# Patient Record
Sex: MALE | Race: WHITE | Employment: OTHER | URBAN - METROPOLITAN AREA
[De-identification: names, ages, dates, MRNs, and addresses within clinical notes are randomized per-mention and may not be internally consistent; named-entity substitution may affect disease eponyms.]

---

## 2017-09-21 DIAGNOSIS — K22.4 DYSKINESIA OF ESOPHAGUS: ICD-10-CM

## 2017-09-21 NOTE — TELEPHONE ENCOUNTER
Pending Prescriptions:                       Disp   Refills    diazepam (VALIUM) 5 MG tablet [Pharmacy Me*40 tab*         Sig: TAKE 1-2 TABLETS BY MOUTH EVERY 6 HOURS AS NEEDED FOR           SPASM         Last Written Prescription Date: 5/24/2016  Last Fill Quantity: 40,  # refills: 0   Last Office Visit with FMDONNELL, [facility] or [facility] prescribing provider: 6/2/2016

## 2017-09-22 ENCOUNTER — OFFICE VISIT (OUTPATIENT)
Dept: FAMILY MEDICINE | Facility: CLINIC | Age: 52
End: 2017-09-22
Payer: COMMERCIAL

## 2017-09-22 VITALS
TEMPERATURE: 98.1 F | SYSTOLIC BLOOD PRESSURE: 128 MMHG | HEART RATE: 88 BPM | OXYGEN SATURATION: 98 % | WEIGHT: 201.4 LBS | DIASTOLIC BLOOD PRESSURE: 78 MMHG | BODY MASS INDEX: 26.69 KG/M2 | RESPIRATION RATE: 15 BRPM | HEIGHT: 73 IN

## 2017-09-22 DIAGNOSIS — K22.4 DYSKINESIA OF ESOPHAGUS: Primary | ICD-10-CM

## 2017-09-22 PROCEDURE — 99213 OFFICE O/P EST LOW 20 MIN: CPT | Performed by: PHYSICIAN ASSISTANT

## 2017-09-22 RX ORDER — DIAZEPAM 5 MG
TABLET ORAL
Qty: 40 TABLET | Refills: 0 | Status: SHIPPED | OUTPATIENT
Start: 2017-09-22 | End: 2019-02-05

## 2017-09-22 RX ORDER — OXYCODONE HYDROCHLORIDE 5 MG/1
5 TABLET ORAL EVERY 4 HOURS PRN
Qty: 20 TABLET | Refills: 0 | Status: SHIPPED | OUTPATIENT
Start: 2017-09-22 | End: 2019-02-05

## 2017-09-22 RX ORDER — DIAZEPAM 5 MG
TABLET ORAL
Start: 2017-09-22

## 2017-09-22 NOTE — TELEPHONE ENCOUNTER
Former [facility] patient  Also seen over a year ago  Advised appt with new PCP    Next 5 appointments (look out 90 days)     Sep 22, 2017  1:00 PM CDT   Office Visit with Familia Samano PA-C   [facility]    [address] [place] [address]   [phone]                Raquel LONG RN

## 2017-09-22 NOTE — NURSING NOTE
"Chief Complaint   Patient presents with     Establish Care     Would like refills of Oxycodone and Diazepam (former Dr. Gupta patient)       Initial /78  Pulse 88  Temp 98.1  F (36.7  C) (Oral)  Resp 15  Ht 6' 1\" (1.854 m)  Wt 201 lb 6.4 oz (91.4 kg)  SpO2 98%  BMI 26.57 kg/m2 Estimated body mass index is 26.57 kg/(m^2) as calculated from the following:    Height as of this encounter: 6' 1\" (1.854 m).    Weight as of this encounter: 201 lb 6.4 oz (91.4 kg).  Medication Reconciliation: complete    Health Maintenance Due Pending Provider Review:  NONE    n/a    Laine Cabrera MA  [facility]  "

## 2017-09-22 NOTE — MR AVS SNAPSHOT
"              After Visit Summary   9/22/2017    Jon Styles    MRN: [id number]           Patient Information     Date Of Birth          1965        Visit Information        Provider Department      9/22/2017 1:00 PM Familia Samano PA-C [facility]        Today's Diagnoses     Dyskinesia of esophagus    -  1       Follow-ups after your visit        Follow-up notes from your care team     Return if symptoms worsen or fail to improve.      Who to contact     If you have questions or need follow up information about today's clinic visit or your schedule please contact [facility] directly at [phone].  Normal or non-critical lab and imaging results will be communicated to you by [employer], letter or phone within 4 business days after the clinic has received the results. If you do not hear from us within 7 days, please contact the clinic through [employer] or phone. If you have a critical or abnormal lab result, we will notify you by phone as soon as possible.  Submit refill requests through [employer] or call your pharmacy and they will forward the refill request to us. Please allow 3 business days for your refill to be completed.          Additional Information About Your Visit        MyChart Information     [employer] gives you secure access to your electronic health record. If you see a primary care provider, you can also send messages to your care team and make appointments. If you have questions, please call your primary care clinic.  If you do not have a primary care provider, please call [phone] and they will assist you.        Care EveryWhere ID     This is your Care EveryWhere ID. This could be used by other organizations to access your [facility] medical records  [id number]        Your Vitals Were     Pulse Temperature Respirations Height Pulse Oximetry BMI (Body Mass Index)    88 98.1  F (36.7  C) (Oral) 15 6' 1\" (1.854 m) 98% 26.57 kg/m2       Blood Pressure from Last 3 " Encounters:   09/22/17 128/78   06/02/16 104/71   03/25/16 116/62    Weight from Last 3 Encounters:   09/22/17 201 lb 6.4 oz (91.4 kg)   06/02/16 194 lb 1.6 oz (88 kg)   03/25/16 197 lb (89.4 kg)              Today, you had the following     No orders found for display         Where to get your medicines      Some of these will need a paper prescription and others can be bought over the counter.  Ask your nurse if you have questions.     Bring a paper prescription for each of these medications     diazepam 5 MG tablet    oxyCODONE 5 MG IR tablet          Primary Care Provider Office Phone # Fax #    Sergio Ezekiel Hastings [phone] [address] [facility] [address]        Equal Access to Services     NASEEM [facility] : Bernabe sweeney Sonegrita, waaxda luqadaha, qaybta kaalmachacorta madden, stephy alexander . So [facility] [phone].    ATENCIÓN: Si habla español, tiene a corey disposición servicios gratuitos de asistencia lingüística. Mohan al [phone].    We comply with applicable federal civil rights laws and [place] laws. We do not discriminate on the basis of race, color, national origin, age, disability sex, sexual orientation or gender identity.            Thank you!     Thank you for choosing [facility]  for your care. Our goal is always to provide you with excellent care. Hearing back from our patients is one way we can continue to improve our services. Please take a few minutes to complete the written survey that you may receive in the mail after your visit with us. Thank you!             Your Updated Medication List - Protect others around you: Learn how to safely use, store and throw away your medicines at www.disposemymeds.org.          This list is accurate as of: 9/22/17 11:59 PM.  Always use your most recent med list.                   Brand Name Dispense Instructions for use Diagnosis    diazepam 5 MG tablet    VALIUM    40 tablet     1-2 Q 6 HRS PRN SPASM    Dyskinesia of esophagus       oxyCODONE 5 MG IR tablet    ROXICODONE    20 tablet    Take 1 tablet (5 mg) by mouth every 4 hours as needed for pain    Dyskinesia of esophagus

## 2017-09-22 NOTE — PROGRESS NOTES
"  SUBJECTIVE:   Jon Styles is a 52 year old male who presents to clinic today for the following health issues:      Chief Complaint   Patient presents with     Establish Care     Would like refills of Oxycodone and Diazepam (former Dr. Gupta patient)              Problem list and histories reviewed & adjusted, as indicated.  Additional history: 52 y.o new to me male here for yearly follow up.  Starting over 10 years ago, he started to have these episodes where his digestion gets off track, and tends to build through the day, and starts to get some more pain over epigastric region.  The pain would increase and he would end up in ER most times.  Through the years, he has tried different treatment includes acid blockers.  He has tried dietary restrictions with getting rid of caffeine, alcohol, etc.  Did have upper endoscopy, which was overall ok.  Some mild inflammation.  He even went to [place], and nothing was really ever found to be an issue.  He has found that if he gets to it early, it does not progress.  He has relied on valium and oxycodone to treat symptoms.  At this point he is just managing symptoms.      The frequency tends to be quite varied.  He can go several months without, but then he can get a day or two in a row.      BP Readings from Last 3 Encounters:   09/22/17 128/78   06/02/16 104/71   03/25/16 116/62    Wt Readings from Last 3 Encounters:   09/22/17 201 lb 6.4 oz (91.4 kg)   06/02/16 194 lb 1.6 oz (88 kg)   03/25/16 197 lb (89.4 kg)                      Reviewed and updated as needed this visit by clinical staffTobacco  Allergies  Meds  Med Hx  Surg Hx  Fam Hx  Soc Hx      Reviewed and updated as needed this visit by Provider         ROS:  Constitutional, HEENT, cardiovascular, pulmonary, gi and gu systems are negative, except as otherwise noted.      OBJECTIVE:   /78  Pulse 88  Temp 98.1  F (36.7  C) (Oral)  Resp 15  Ht 6' 1\" (1.854 m)  Wt 201 lb 6.4 oz (91.4 kg)  SpO2 98% "  BMI 26.57 kg/m2  Body mass index is 26.57 kg/(m^2).  GENERAL: alert and no distress  EYES: Eyes grossly normal to inspection  HENT: ear canals and TM's normal, nose and mouth without ulcers or lesions  RESP: lungs clear to auscultation - no rales, rhonchi or wheezes  CV: regular rate and rhythm, normal S1 S2, no S3 or S4, no murmur, click or rub, no peripheral edema and peripheral pulses strong  PSYCH: mentation appears normal, affect normal/bright    Diagnostic Test Results:  none     ASSESSMENT/PLAN:         1. Dyskinesia of esophagus  I am not sure I can fully explain symptoms at this time.  Possibility of abdominal epilepsy, but with the rare frequency, I will let him determine if he wishes further follow up.  He does use very prn medications, so I am ok with previous refill policy.    - oxyCODONE (ROXICODONE) 5 MG IR tablet; Take 1 tablet (5 mg) by mouth every 4 hours as needed for pain  Dispense: 20 tablet; Refill: 0  - diazepam (VALIUM) 5 MG tablet; 1-2 Q 6 HRS PRN SPASM  Dispense: 40 tablet; Refill: 0    Follow up yearly.    Familia Samano PA-C  [facility]

## 2019-02-05 ENCOUNTER — OFFICE VISIT (OUTPATIENT)
Dept: FAMILY MEDICINE | Facility: CLINIC | Age: 54
End: 2019-02-05
Payer: COMMERCIAL

## 2019-02-05 VITALS
WEIGHT: 204 LBS | SYSTOLIC BLOOD PRESSURE: 107 MMHG | HEART RATE: 65 BPM | BODY MASS INDEX: 27.04 KG/M2 | RESPIRATION RATE: 14 BRPM | TEMPERATURE: 100.4 F | OXYGEN SATURATION: 99 % | HEIGHT: 73 IN | DIASTOLIC BLOOD PRESSURE: 63 MMHG

## 2019-02-05 DIAGNOSIS — K22.4 DYSKINESIA OF ESOPHAGUS: ICD-10-CM

## 2019-02-05 DIAGNOSIS — K21.00 GASTROESOPHAGEAL REFLUX DISEASE WITH ESOPHAGITIS: Primary | ICD-10-CM

## 2019-02-05 PROCEDURE — 99214 OFFICE O/P EST MOD 30 MIN: CPT | Performed by: FAMILY MEDICINE

## 2019-02-05 RX ORDER — OXYCODONE HYDROCHLORIDE 5 MG/1
5 TABLET ORAL EVERY 4 HOURS PRN
Qty: 20 TABLET | Refills: 0 | Status: SHIPPED | OUTPATIENT
Start: 2019-02-05 | End: 2020-06-02

## 2019-02-05 RX ORDER — DIAZEPAM 5 MG
TABLET ORAL
Qty: 20 TABLET | Refills: 0 | Status: SHIPPED | OUTPATIENT
Start: 2019-02-05 | End: 2020-06-02

## 2019-02-05 ASSESSMENT — MIFFLIN-ST. JEOR: SCORE: 1824.22

## 2019-02-05 NOTE — NURSING NOTE
"Chief Complaint   Patient presents with     Recheck Medication     follow up for refills       Initial /63   Pulse 65   Temp 100.4  F (38  C) (Tympanic)   Resp 14   Ht 1.854 m (6' 1\")   Wt 92.5 kg (204 lb)   SpO2 99%   BMI 26.91 kg/m   Estimated body mass index is 26.91 kg/m  as calculated from the following:    Height as of this encounter: 1.854 m (6' 1\").    Weight as of this encounter: 92.5 kg (204 lb).  BP completed using cuff size: large    Health Maintenance that is potentially due pending provider review:  Health Maintenance Due   Topic Date Due     HIV SCREEN (SYSTEM ASSIGNED)  02/22/1983     ZOSTER IMMUNIZATION (1 of 2) 02/22/2015     PHQ-2 Q1 YR  06/02/2017     DTAP/TDAP/TD IMMUNIZATION (2 - Td) 05/01/2018     INFLUENZA VACCINE (1) 09/01/2018         No flu vaccine this yr  "

## 2019-02-05 NOTE — PROGRESS NOTES
"  SUBJECTIVE:   Jon Styles is a 53 year old male who presents to clinic today for the following health issues:    Starting over 10 years ago, he started to have these episodes where his digestion gets off track, and tends to build through the day, and starts to get some more pain over epigastric region.  The pain would increase and he would end up in ER most times.  Through the years, he has tried different treatment includes acid blockers.  He has tried dietary restrictions with getting rid of caffeine, alcohol, etc.    Follow up for pain  Medication refills  About once a month has a problem  Uses some opiates and valium for this periodically  Went to [place] and had an endoscopy 4-5 years ago  Didn't come up with much    Did have upper endoscopy, which was overall ok.  Some mild inflammation.  He even went to [place], and nothing was really ever found to be an issue.  He has found that if he gets to it early, it does not progress.  He has relied on valium and oxycodone to treat symptoms.  At this point he is just managing symptoms.      The frequency tends to be quite varied.  He can go several months without, but then he can get a day or two in a row.      Tried nitroglycerin, other modalities for esophageal spasm      BP Readings from Last 3 Encounters:   02/05/19 107/63   09/22/17 128/78   06/02/16 104/71    Wt Readings from Last 3 Encounters:   02/05/19 92.5 kg (204 lb)   09/22/17 91.4 kg (201 lb 6.4 oz)   06/02/16 88 kg (194 lb 1.6 oz)            Reviewed and updated as needed this visit by clinical staffTobacco  Allergies  Meds  Med Hx  Surg Hx  Fam Hx  Soc Hx      Reviewed and updated as needed this visit by Provider         ROS:  Constitutional, HEENT, cardiovascular, pulmonary, gi and gu systems are negative, except as otherwise noted.      OBJECTIVE:   /63   Pulse 65   Temp 100.4  F (38  C) (Tympanic)   Resp 14   Ht 1.854 m (6' 1\")   Wt 92.5 kg (204 lb)   SpO2 99%   BMI 26.91 kg/m    Body " mass index is 26.91 kg/m .  GENERAL: alert and no distress  PSYCH: mentation appears normal, affect normal/bright    Diagnostic Test Results:  none     ASSESSMENT/PLAN:     1. Dyskinesia of esophagus  Unexplained symptoms      He does use very prn medications, so I am ok with previous refill policy, last Rx was > 1 year ago  Tried other modalities    - oxyCODONE (ROXICODONE) 5 MG IR tablet; Take 1 tablet (5 mg) by mouth every 4 hours as needed for pain  Dispense: 20 tablet; Refill: 0  - diazepam (VALIUM) 5 MG tablet; 1-2 Q 6 HRS PRN SPASM  Dispense: 40 tablet; Refill: 0    2. Gastroesophageal reflux disease with esophagitis  5 year follow up of esophagitis recommended  shakila survaillance  - GASTROENTEROLOGY ADULT REF PROCEDURE ONLY Megan Alatorre [phone]; Dr. DELIA Lao    Otherwise Follow up yearly.    Sergio Hastings MD

## 2019-10-05 ENCOUNTER — HEALTH MAINTENANCE LETTER (OUTPATIENT)
Age: 54
End: 2019-10-05

## 2020-05-19 DIAGNOSIS — K22.4 DYSKINESIA OF ESOPHAGUS: ICD-10-CM

## 2020-05-19 RX ORDER — OXYCODONE HYDROCHLORIDE 5 MG/1
5 TABLET ORAL EVERY 4 HOURS PRN
Qty: 20 TABLET | Refills: 0 | Status: CANCELLED | OUTPATIENT
Start: 2020-05-19

## 2020-05-19 RX ORDER — DIAZEPAM 5 MG
TABLET ORAL
Qty: 20 TABLET | Refills: 0 | Status: CANCELLED | OUTPATIENT
Start: 2020-05-19

## 2020-05-19 NOTE — TELEPHONE ENCOUNTER
Reason for Call:  Medication or medication refill:    Do you use a [facility]?  Name of the pharmacy and phone number for the current request:   [facility]ACY #[id number] - [address]    Name of the medication requested:    diazepam (VALIUM) 5 MG tablet  20 tablet      oxyCODONE (ROXICODONE) 5 MG tablet  20 tablet          Other request: please refill rx.     Can we leave a detailed message on this number? YES    Phone number patient can be reached at: Home number on file [phone] (home)    Best Time: any    Call taken on 5/19/2020 at 2:17 PM by Marquise Boone

## 2020-05-21 ENCOUNTER — VIRTUAL VISIT (OUTPATIENT)
Dept: FAMILY MEDICINE | Facility: CLINIC | Age: 55
End: 2020-05-21
Payer: COMMERCIAL

## 2020-05-21 DIAGNOSIS — K22.4 DYSKINESIA OF ESOPHAGUS: Primary | ICD-10-CM

## 2020-05-21 DIAGNOSIS — Z20.822 EXPOSURE TO COVID-19 VIRUS: ICD-10-CM

## 2020-05-21 PROCEDURE — 99213 OFFICE O/P EST LOW 20 MIN: CPT | Mod: 95 | Performed by: FAMILY MEDICINE

## 2020-05-21 RX ORDER — DIAZEPAM 5 MG
5 TABLET ORAL EVERY 6 HOURS PRN
Qty: 12 TABLET | Refills: 0 | Status: SHIPPED | OUTPATIENT
Start: 2020-05-21 | End: 2021-03-09

## 2020-05-21 RX ORDER — OXYCODONE HYDROCHLORIDE 5 MG/1
5 TABLET ORAL EVERY 6 HOURS PRN
Qty: 12 TABLET | Refills: 0 | Status: SHIPPED | OUTPATIENT
Start: 2020-05-21 | End: 2021-11-16

## 2020-05-21 NOTE — PROGRESS NOTES
"Jon Styles is a 55 year old male who is being evaluated via a billable telephone visit.      The patient has been notified of following:     \"This telephone visit will be conducted via a call between you and your physician/provider. We have found that certain health care needs can be provided without the need for a physical exam.  This service lets us provide the care you need with a short phone conversation.  If a prescription is necessary we can send it directly to your pharmacy.  If lab work is needed we can place an order for that and you can then stop by our lab to have the test done at a later time.    Telephone visits are billed at different rates depending on your insurance coverage. During this emergency period, for some insurers they may be billed the same as an in-person visit.  Please reach out to your insurance provider with any questions.    If during the course of the call the physician/provider feels a telephone visit is not appropriate, you will not be charged for this service.\"    Patient has given verbal consent for Telephone visit?  Yes    What phone number would you like to be contacted at? [phone]    How would you like to obtain your AVS? Clifford Thompson     Jon Styles is a 55 year old male who presents via phone visit today for the following health issues:    HPI  Medication Followup of Oxycodone    Taking Medication as prescribed: yes    Side Effects:  None    Medication Helping Symptoms:  yes        Starting over 10 years ago, he started to have these episodes where his digestion gets off track, and tends to build through the day, and starts to get some more pain over epigastric region.  The pain would increase and he would end up in ER most times.  Through the years, he has tried different treatment includes acid blockers.  He has tried dietary restrictions with getting rid of caffeine, alcohol, etc.    Follow up for pain  Medication refills  About once a month has a " problem  Uses some opiates and valium for this periodically  Went to [place] and had an endoscopy 4-5 years ago  Didn't come up with much    Did have upper endoscopy, which was overall ok.  Some mild inflammation.  He even went to [place], and nothing was really ever found to be an issue.  He has found that if he gets to it early, it does not progress.  He has relied on valium and oxycodone to treat symptoms.  At this point he is just managing symptoms.      The frequency tends to be quite varied.  He can go several months without, but then he can get a day or two in a row.      Tried nitroglycerin, other modalities for esophageal spasm    Reviewed and updated as needed this visit by Provider         Review of Systems   CONSTITUTIONAL: NEGATIVE for fever, chills, change in weight  ENT/MOUTH: NEGATIVE for ear, mouth and throat problems  RESP: NEGATIVE for significant cough or SOB  CV: NEGATIVE for chest pain, palpitations or peripheral edema       Objective   Reported vitals:  There were no vitals taken for this visit.   healthy, alert and no distress  PSYCH: Alert and oriented times 3; coherent speech, normal   rate and volume, able to articulate logical thoughts, able   to abstract reason, no tangential thoughts, no hallucinations   or delusions  His affect is normal  RESP: No cough, no audible wheezing, able to talk in full sentences  Remainder of exam unable to be completed due to telephone visits    Diagnostic Test Results:  Labs reviewed in Epic        Assessment/Plan:  1. Dyskinesia of esophagus    - oxyCODONE (ROXICODONE) 5 MG tablet; Take 1 tablet (5 mg) by mouth every 6 hours as needed for pain  Dispense: 12 tablet; Refill: 0  - diazepam (VALIUM) 5 MG tablet; Take 1 tablet (5 mg) by mouth every 6 hours as needed for anxiety  Dispense: 12 tablet; Refill: 0    Unexplained symptoms      He does use very prn medications, so I am ok with previous refill policy, last Rx was > 1 year ago  Tried other modalities    2.  Exposure to Covid-19 Virus    Also discussed risk, was in [place] during pandemic    - COVID-19 Virus (Coronavirus) Antibody; Future    No follow-ups on file.      Phone call duration:  12 minutes    Sergio Hastings MD

## 2020-05-21 NOTE — NURSING NOTE
"Chief Complaint   Patient presents with     Recheck Medication     oxycodone     initial There were no vitals taken for this visit. Estimated body mass index is 26.91 kg/m  as calculated from the following:    Height as of 2/5/19: 1.854 m (6' 1\").    Weight as of 2/5/19: 92.5 kg (204 lb).  BP completed using cuff size: .  L  R arm      Health Maintenance that is potentially due pending provider review:    Anjel Reyes ma  "

## 2020-05-26 DIAGNOSIS — Z20.822 EXPOSURE TO COVID-19 VIRUS: ICD-10-CM

## 2020-05-26 LAB
COVID-19 SPIKE RBD ABY TITER: NORMAL
COVID-19 SPIKE RBD ABY: NEGATIVE

## 2020-05-26 PROCEDURE — 99000 SPECIMEN HANDLING OFFICE-LAB: CPT | Performed by: FAMILY MEDICINE

## 2020-05-26 PROCEDURE — 86769 SARS-COV-2 COVID-19 ANTIBODY: CPT | Mod: 90 | Performed by: FAMILY MEDICINE

## 2020-05-26 PROCEDURE — 36415 COLL VENOUS BLD VENIPUNCTURE: CPT | Performed by: FAMILY MEDICINE

## 2020-06-02 RX ORDER — DIAZEPAM 5 MG
TABLET ORAL
Qty: 20 TABLET | Refills: 0 | Status: SHIPPED | OUTPATIENT
Start: 2020-06-02 | End: 2021-03-12

## 2020-06-02 RX ORDER — OXYCODONE HYDROCHLORIDE 5 MG/1
5 TABLET ORAL EVERY 4 HOURS PRN
Qty: 20 TABLET | Refills: 0 | Status: SHIPPED | OUTPATIENT
Start: 2020-06-02 | End: 2021-03-12
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
## 2020-10-19 ENCOUNTER — IMMUNIZATION (OUTPATIENT)
Dept: NURSING | Facility: CLINIC | Age: 55
End: 2020-10-19
Payer: COMMERCIAL

## 2020-10-19 DIAGNOSIS — Z23 NEED FOR PROPHYLACTIC VACCINATION AND INOCULATION AGAINST INFLUENZA: Primary | ICD-10-CM

## 2020-10-19 PROCEDURE — 90682 RIV4 VACC RECOMBINANT DNA IM: CPT

## 2020-10-19 PROCEDURE — 90471 IMMUNIZATION ADMIN: CPT

## 2020-10-19 PROCEDURE — 99207 PR NO CHARGE NURSE ONLY: CPT

## 2020-11-14 ENCOUNTER — HEALTH MAINTENANCE LETTER (OUTPATIENT)
Age: 55
End: 2020-11-14

## 2021-03-08 NOTE — PROGRESS NOTES
"Jon is a 56 year old who is being evaluated via a billable video visit.      How would you like to obtain your AVS? MyChart  If the video visit is dropped, the invitation should be resent by: Text to cell phone: [phone]  Will anyone else be joining your video visit? No    Video Start Time: 11:32 AM    Assessment & Plan     Sebaceous cyst  Synptomatic  This appears large enough visibly that I would not feel comfortable doing this in the office  rec follow up with general surgery    - GENERAL SURG ADULT REFERRAL; Future    No follow-ups on file.    Sergio Hastings MD  [facility]      Subjective   Jon is a 56 year old who presents for the following health issues     HPI       Concern - Cyst on chest   Onset: Years - getting larger in size over the years - time to take care of it he said   Description: large cyst on chest that \"oozes \" smelly fluid  Intensity: NA  Progression of Symptoms:  worsening  Accompanying Signs & Symptoms: denies other symptoms - not warm to the touch or red   Previous history of similar problem: Has been present for years  Precipitating factors:        Worsened by: hard to say he said - cannot correlate to anything specifically   Alleviating factors:        Improved by: has not tried anything  Therapies tried and outcome: None      Review of Systems   Constitutional, HEENT, cardiovascular, pulmonary, GI, , musculoskeletal, neuro, skin, endocrine and psych systems are negative, except as otherwise noted.      Objective           Vitals:  No vitals were obtained today due to virtual visit.    Physical Exam   GENERAL: Healthy, alert and no distress  EYES: Eyes grossly normal to inspection.  No discharge or erythema, or obvious scleral/conjunctival abnormalities.  RESP: No audible wheeze, cough, or visible cyanosis.  No visible retractions or increased work of breathing.    SKIN: 3cm sebaceous cyst on the R of sternum chest wall/R breast  NEURO: Cranial " nerves grossly intact.  Mentation and speech appropriate for age.  PSYCH: Mentation appears normal, affect normal/bright, judgement and insight intact, normal speech and appearance well-groomed.            Video-Visit Details    Type of service:  Video Visit    Video End Time: 11:39 AM      Originating Location (pt. Location): Home    Distant Location (provider location):  [facility]     Platform used for Video Visit: [employer]

## 2021-03-09 ENCOUNTER — VIRTUAL VISIT (OUTPATIENT)
Dept: FAMILY MEDICINE | Facility: CLINIC | Age: 56
End: 2021-03-09
Payer: COMMERCIAL

## 2021-03-09 DIAGNOSIS — L72.3 SEBACEOUS CYST: Primary | ICD-10-CM

## 2021-03-09 PROCEDURE — 99213 OFFICE O/P EST LOW 20 MIN: CPT | Mod: GT | Performed by: FAMILY MEDICINE

## 2021-03-12 ENCOUNTER — OFFICE VISIT (OUTPATIENT)
Dept: SURGERY | Facility: CLINIC | Age: 56
End: 2021-03-12
Payer: COMMERCIAL

## 2021-03-12 VITALS
BODY MASS INDEX: 25.84 KG/M2 | SYSTOLIC BLOOD PRESSURE: 124 MMHG | WEIGHT: 195 LBS | DIASTOLIC BLOOD PRESSURE: 80 MMHG | HEART RATE: 64 BPM | HEIGHT: 73 IN

## 2021-03-12 DIAGNOSIS — L72.0 EPIDERMOID CYST OF SKIN OF CHEST: Primary | ICD-10-CM

## 2021-03-12 PROCEDURE — 99202 OFFICE O/P NEW SF 15 MIN: CPT | Performed by: SURGERY

## 2021-03-12 ASSESSMENT — MIFFLIN-ST. JEOR: SCORE: 1768.39

## 2021-03-12 NOTE — LETTER
March 15, 2021          Sergio Hastings MD  [address] [place] [address]      RE:   Jon Styles 1965      Dear Colleague,    Thank you for referring your patient, Jon Styles, to [facility] at [facility]. Please see a copy of my visit note below.    [facility] Consultation        Chief Complaint   Patient presents for Consult of Cyst on Chest     HISTORY OF PRESENT ILLNESS:   Jon Styles is a 56 year old male who is seen in consultation at the request of Dr. Hastings for evaluation of cyst on chest. The patient reports that he has had a subcutaneous cystic mass present over the anterior aspect of his mid chest for approximately 15 years. Over time, the mass has increased in size. The mass has never become infected. He denies any erythema or significant pain related to the mass. However, approximately 3 years ago, the patient started to notice intermittent expressible malodorous drainage related to the mass. Patient denies history of previous or current similar masses.     ASSESSMENT:   Jon Styles is a 56 year old who presents with subcutaneous cystic mass of anterior chest. Significant pertinent co-morbidities include: None.     PLAN:   Following a thorough discussion with the patient, the decision was made to proceed with excision of the patient's subcutaneous cystic chest wall mass. The risks and benefits of the procedure were discussed with the patient. We will plan to perform the procedure with local anesthetic only in the endoscopy procedure suites. The patient is going to review his schedule and will contact our clinic directly when he is ready to schedule the procedure.       Again, thank you for allowing me to participate in the care of your patient.      Sincerely,      Chanel Spence MD

## 2021-03-12 NOTE — PROGRESS NOTES
[facility] General Surgery Clinic Consultation    CHIEF COMPLAINT:  Chief Complaint   Patient presents with     Consult     Cyst on chest        HISTORY OF PRESENT ILLNESS:  Jon Styles is a 56 year old male who is seen in consultation at the request of Dr. Hastings for evaluation of cyst on chest.  The patient reports that he has had a subcutaneous cystic mass present over the anterior aspect of his mid chest for approximately 15 years.  Over time, the mass has increased in size.  The mass has never become infected.  He denies any erythema or significant pain related to the mass.  However, approximately 3 years ago, the patient started to notice intermittent expressible malodorous drainage related to the mass.  Patient denies history of previous or current similar masses.    REVIEW OF SYSTEMS:  Constitutional: No fevers or chills  Eyes: No blurred or double vision  HENT: Denies headaches, No rhinorrhea, No sore throat  Respiratory: No cough or shortness of breath  Cardiovascular: Denies chest pain or palpitations  Gastrointestinal: No abdominal pain or nausea/vomiting  Genitourinary: No hematuria or dysuria  Musculoskeletal: Denies arthralgias or myalgias  Neurologic: No numbness or tingling  Integumentary: No skin rashes    Past Medical History:   Diagnosis Date     Arthritis     feet     Atrophic gastritis without mention of hemorrhage        Past Surgical History:   Procedure Laterality Date     COLONOSCOPY N/A 10/13/2015    Procedure: COLONOSCOPY;  Surgeon: Manuel Lao MD;  Location:  GI     HC TOOTH EXTRACTION W/FORCEP       HERNIA REPAIR  2010    inguinal       Family History   Problem Relation Age of Onset     Cancer Father         Pancreatic      Family History Negative Mother        Social History     Tobacco Use     Smoking status: Former Smoker     Quit date: 10/13/1995     Years since quittin.4     Smokeless tobacco: Never Used   Substance Use Topics     Alcohol use: Yes     Alcohol/week:  "0.0 standard drinks     Comment: 1 drink per day        Patient Active Problem List   Diagnosis     Dyskinesia of esophagus     HYPERLIPIDEMIA LDL GOAL <160     Hx of healed stress fx       Allergies   Allergen Reactions     No Known Drug Allergies      Seasonal Allergies        No current outpatient medications on file.       Vitals: /80   Pulse 64   Ht 1.854 m (6' 1\")   Wt 88.5 kg (195 lb)   BMI 25.73 kg/m    BMI= Body mass index is 25.73 kg/m .    EXAM:  General: Vital signs reviewed, in no apparent distress  Eyes: Anicteric  HENT: Normocephalic, atraumatic, trachea midline   Respiratory: Breathing nonlabored  Cardiovascular: Regular rate and rhythm  Musculoskeletal: No gross deformities  Neurologic: Grossly nonfocal exam  Psychiatric: Normal mood, affect and insight  Integumentary: Approximately 5 cm diameter subcutaneous cystic mass of central anterior chest with visible central pore    ASSESSMENT:  Jon Styles is a 56 year old who presents with subcutaneous cystic mass of anterior chest.  Significant pertinent co-morbidities include: None.       PLAN:  Following a thorough discussion with the patient, the decision was made to proceed with excision of the patient's subcutaneous cystic chest wall mass.  The risks and benefits of the procedure were discussed with the patient.  We will plan to perform the procedure with local anesthetic only in the endoscopy procedure suites.  The patient is going to review his schedule and will contact our clinic directly when he is ready to schedule the procedure.    It was my pleasure to participate in the care of Jon Styles in clinic today. Thank you for this consultation.         Chanel Spence MD    Please route or send letter to:  Primary Care Provider (PCP) and Referring Provider    "

## 2021-04-28 DIAGNOSIS — L72.9 SUBCUTANEOUS CYST: Primary | ICD-10-CM

## 2021-04-29 ENCOUNTER — HOSPITAL ENCOUNTER (OUTPATIENT)
Facility: CLINIC | Age: 56
End: 2021-04-29
Attending: SURGERY | Admitting: SURGERY
Payer: COMMERCIAL

## 2021-04-29 ENCOUNTER — TELEPHONE (OUTPATIENT)
Dept: SURGERY | Facility: CLINIC | Age: 56
End: 2021-04-29

## 2021-04-29 DIAGNOSIS — L72.9 SUBCUTANEOUS CYST: ICD-10-CM

## 2021-04-29 DIAGNOSIS — Z11.59 ENCOUNTER FOR SCREENING FOR OTHER VIRAL DISEASES: ICD-10-CM

## 2021-04-30 ENCOUNTER — TELEPHONE (OUTPATIENT)
Dept: GASTROENTEROLOGY | Facility: CLINIC | Age: 56
End: 2021-04-30

## 2021-04-30 ENCOUNTER — TELEPHONE (OUTPATIENT)
Dept: SURGERY | Facility: CLINIC | Age: 56
End: 2021-04-30

## 2021-04-30 DIAGNOSIS — Z11.59 ENCOUNTER FOR SCREENING FOR OTHER VIRAL DISEASES: ICD-10-CM

## 2021-04-30 LAB
LABORATORY COMMENT REPORT: ABNORMAL
SARS-COV-2 RNA RESP QL NAA+PROBE: NORMAL
SARS-COV-2 RNA RESP QL NAA+PROBE: POSITIVE
SPECIMEN SOURCE: ABNORMAL
SPECIMEN SOURCE: NORMAL

## 2021-04-30 PROCEDURE — U0005 INFEC AGEN DETEC AMPLI PROBE: HCPCS | Performed by: SURGERY

## 2021-04-30 PROCEDURE — U0003 INFECTIOUS AGENT DETECTION BY NUCLEIC ACID (DNA OR RNA); SEVERE ACUTE RESPIRATORY SYNDROME CORONAVIRUS 2 (SARS-COV-2) (CORONAVIRUS DISEASE [COVID-19]), AMPLIFIED PROBE TECHNIQUE, MAKING USE OF HIGH THROUGHPUT TECHNOLOGIES AS DESCRIBED BY CMS-2020-01-R: HCPCS | Performed by: SURGERY

## 2021-04-30 NOTE — TELEPHONE ENCOUNTER
Coronavirus (COVID-19) Notification    Reason for call  Notify of POSITIVE  COVID-19 lab result, assess symptoms,  review [facility] recommendations    Lab Result   Lab test for 2019-nCoV rRt-PCR or SARS-COV-2 PCR  Oropharyngeal AND/OR nasopharyngeal swabs were POSITIVE for 2019-nCoV RNA [OR] SARS-COV-2 RNA (COVID-19) RNA     We have been unable to reach Patient by phone at this time to notify of their Positive COVID-19 result.  Left voicemail message requesting a call back to [phone] [facility] for results.        POSITIVE COVID-19 Letter sent.    Indiana Carmona LPN

## 2021-05-01 NOTE — TELEPHONE ENCOUNTER
"-Coronavirus (COVID-19) Notification    Pt calling back about msg he had gotten, informed him he is positive. He wasn't very happy about the news as he has proc schedule on Monday. Mentioned about him reaching out via phone/mychart to proc care team to speak with them regards to his proc. Pt stated this is his 2nd positive, and he will not be doing the isolation requirements as before. He ended the call before getting through with script.      Caller Name (Patient, parent, daughter/son, grandparent, etc)  Pt    Reason for call  Notify of Positive Coronavirus (COVID-19) lab results, assess symptoms,  review [facility] recommendations    Lab Result    Lab test:  2019-nCoV rRt-PCR or SARS-CoV-2 PCR    Oropharyngeal AND/OR nasopharyngeal swabs is POSITIVE for 2019-nCoV RNA/SARS-COV-2 PCR (COVID-19 virus)    RN Recommendations/Instructions per [facility] Coronavirus COVID-19 recommendations    Brief introduction script  Introduce self then review script:  \"I am calling on behalf of [employer].  We were notified that your Coronavirus test (COVID-19) for was POSITIVE for the virus.  I have some information to relay to you but first I wanted to mention that the [place] Dept of Health will be contacting you shortly [it's possible [facility] already called Patient] to talk to you more about how you are feeling and other people you have had contact with who might now also have the virus.  Also, [facility] is Partnering with the [facility] for Covid-19 research, you may be contacted directly by research staff.\"    Assessment (Inquire about Patient's current symptoms)   Assessment   Current Symptoms at time of phone call: (if no symptoms, document No symptoms] none   Symptoms onset (if applicable) n/a     If at time of call, Patients symptoms hare worsened, the Patient should contact 911 or have someone drive them to Emergency Dept promptly:      If Patient calling 911, inform 911 personal that you have " "tested positive for the Coronavirus (COVID-19).  Place mask on and await 911 to arrive.    If Emergency Dept, If possible, please have another adult drive you to the Emergency Dept but you need to wear mask when in contact with other people.      Monoclonal Antibody Administration    You may be eligible to receive a new treatment with a monoclonal antibody for preventing hospitalization in patients at high risk for complications from COVID-19.   This medication is still experimental and available on a limited basis; it is given through an IV and must be given at an infusion center. Please note that not all people who are eligible will receive the medication since it is in limited supply.     Are you interested in being considered for this medication?  No.   Does the patient fit the criteria: No pt doesn't have sx    If patient qualifies based on above criteria:  \"You will be contacted if you are selected to receive this treatment in the next 1-2 business days.   This is time sensitive and if you are not selected in the next 1-2 business days, you will not receive the medication.  If you do not receive a call to schedule, you have not been selected.\"      Review information with Patient    Your result was positive. This means you have COVID-19 (coronavirus).  We have sent you a letter that reviews the information that I'll be reviewing with you now.    How can I protect others?    If you have symptoms: stay home and away from others (self-isolate) until:    You've had no fever--and no medicine that reduces fever--for 1 full day (24 hours). And       Your other symptoms have gotten better. For example, your cough or breathing has improved. And     At least 10 days have passed since your symptoms started. (If you've been told by a doctor that you have a weak immune system, wait 20 days.)     If you don't have symptoms: Stay home and away from others (self-isolate) until at least 10 days have passed since your first " positive COVID-19 test. (Date test collected)    During this time:    Stay in your own room, including for meals. Use your own bathroom if you can.    Stay away from others in your home. No hugging, kissing or shaking hands. No visitors.     Don't go to work, school or anywhere else.     Clean  high touch  surfaces often (doorknobs, counters, handles, etc.). Use a household cleaning spray or wipes. You'll find a full list on the EPA website at www.epa.gov/pesticide-registration/list-n-disinfectants-use-against-sars-cov-2.     Cover your mouth and nose with a mask, tissue or other face covering to avoid spreading germs.    Wash your hands and face often with soap and water.    Make a list of people you have been in close contact with recently, even if either of you wore a face covering.   ; Start your list from 2 days before you became ill or had a positive test.  ; Include anyone that was within 6 feet of you for a cumulative total of 15 minutes or more in 24 hours. (Example: if you sat next to Gregorio for 5 minutes in the morning and 10 minutes in the afternoon, then you were in close contact for 15 minutes total that day. Gregorio would be added to your list.)    A public health worker will call or text you. It is important that you answer. They will ask you questions about possible exposures to COVID-19, such as people you have been in direct contact with and places you have visited.    Tell the people on your list that you have COVID-19; they should stay away from others for 14 days starting from the last time they were in contact with you (unless you are told something different from a public health worker).     Caregivers in these groups are at risk for severe illness due to COVID-19:  o People 65 years and older  o People who live in a nursing home or long-term care facility  o People with chronic disease (lung, heart, cancer, diabetes, kidney, liver, immunologic)  o People who have a weakened immune system,  including those who:  - Are in cancer treatment  - Take medicine that weakens the immune system, such as corticosteroids  - Had a bone marrow or organ transplant  - Have an immune deficiency  - Have poorly controlled HIV or AIDS  - Are obese (body mass index of 40 or higher)  - Smoke regularly    Caregivers should wear gloves while washing dishes, handling laundry and cleaning bedrooms and bathrooms.    Wash and dry laundry with special caution. Don't shake dirty laundry, and use the warmest water setting you can.    If you have a weakened immune system, ask your doctor about other actions you should take.    For more tips, go to www.cdc.gov/coronavirus/2019-ncov/downloads/10Things.pdf.    You should not go back to work until you meet the guidelines above for ending your home isolation. You don't need to be retested for COVID-19 before going back to work--studies show that you won't spread the virus if it's been at least 10 days since your symptoms started (or 20 days, if you have a weak immune system).    Employers: This document serves as formal notice of your employee's medical guidelines for going back to work. They must meet the above guidelines before going back to work in person.    How can I take care of myself?    1. Get lots of rest. Drink extra fluids (unless a doctor has told you not to).    2. Take Tylenol (acetaminophen) for fever or pain. If you have liver or kidney problems, ask your family doctor if it's okay to take Tylenol.     Take either:     650 mg (two 325 mg pills) every 4 to 6 hours, or     1,000 mg (two 500 mg pills) every 8 hours as needed.     Note: Don't take more than 3,000 mg in one day. Acetaminophen is found in many medicines (both prescribed and over-the-counter medicines). Read all labels to be sure you don't take too much.    For children, check the Tylenol bottle for the right dose (based on their age or weight).    3. If you have other health problems (like cancer, heart  failure, an organ transplant or severe kidney disease): Call your specialty clinic if you don't feel better in the next 2 days.    4. Know when to call 911: Emergency warning signs include:    Trouble breathing or shortness of breath    Pain or pressure in the chest that doesn't go away    Feeling confused like you haven't felt before, or not being able to wake up    Bluish-colored lips or face    5. Sign up for [employer]. We know it's scary to hear that you have COVID-19. We want to track your symptoms to make sure you're okay over the next 2 weeks. Please look for an email from [employer]--this is a free, online program that we'll use to keep in touch. To sign up, follow the link in the email. Learn more at [email or web address].    Where can I get more information?    [facility]: [email or web address]    Coronavirus Basics: www.health.[facility].[place]./diseases/coronavirus/basics.html    What to Do If You're Sick: www.cdc.gov/coronavirus/2019-ncov/about/steps-when-sick.html    Ending Home Isolation: www.cdc.gov/coronavirus/2019-ncov/hcp/disposition-in-home-patients.html     Caring for Someone with COVID-19: www.cdc.gov/coronavirus/2019-ncov/if-you-are-sick/care-for-someone.html     [facility] clinical trials (COVID-19 research studies): clinicalaffairs.[facility]-clinical-trials     A Positive COVID-19 letter will be sent via [employer] or the mail. (Exception, no letters sent to Presurgerical/Preprocedure Patients)    Malou Nettles

## 2021-05-01 NOTE — TELEPHONE ENCOUNTER
Surgery:    Patient contacted to notify him of positive preoperative Covid result.  Patient reports that he has been fully vaccinated.  Sometime after my consult visit with the patient (March 12), the patient actually did test positive for Covid.  He believes this was around March 21.  At this time, he is completely asymptomatic.  After reviewing the options, the patient has elected to cancel and reschedule his procedure for Monday, May 3.  I informed him that one of our surgery schedulers will contact him directly to reschedule.  He was in agreement with this plan and very appreciative.    Chanel Spence MD

## 2021-05-03 ENCOUNTER — PREP FOR PROCEDURE (OUTPATIENT)
Dept: SURGERY | Facility: CLINIC | Age: 56
End: 2021-05-03

## 2021-05-03 ENCOUNTER — MYC MEDICAL ADVICE (OUTPATIENT)
Dept: FAMILY MEDICINE | Facility: CLINIC | Age: 56
End: 2021-05-03

## 2021-05-03 DIAGNOSIS — R22.2 CHEST WALL MASS: Primary | ICD-10-CM

## 2021-05-04 PROBLEM — R22.2 CHEST WALL MASS: Status: ACTIVE | Noted: 2021-05-04

## 2021-05-04 RX ORDER — LIDOCAINE HYDROCHLORIDE 10 MG/ML
10 INJECTION, SOLUTION EPIDURAL; INFILTRATION; INTRACAUDAL; PERINEURAL ONCE
Status: CANCELLED | OUTPATIENT
Start: 2021-05-04 | End: 2021-05-04

## 2021-05-30 NOTE — TELEPHONE ENCOUNTER
Continue medications as prescribed  Have your pharmacy contact us for a refill if you are running low on medications (We may ask you to come into clinic to get a refill from the nurse  No Alcohol or drug use  No driving if sedated  Call the clinic with any questions or concerns   Reach out for help if you feel like hurting yourself or others ([facility] [phone]: [address] [facility] [facility] [address], [address] or [facility] Suicide Hotline [phone] , call 911 or go to nearest Emergency room    Follow up as directed, for your appointments, per your After Visit Summary Form.               Pt will discuss refills with PCP at telephone visit.    Gina Zeng RN      Next 5 appointments (look out 90 days)    May 21, 2020 11:00 AM CDT  Telephone Visit with Sergio Hastings MD  [facility] ([facility]) [address] [id number] [phone]

## 2021-06-03 ENCOUNTER — APPOINTMENT (OUTPATIENT)
Dept: SURGERY | Facility: PHYSICIAN GROUP | Age: 56
End: 2021-06-03
Payer: COMMERCIAL

## 2021-06-03 ENCOUNTER — HOSPITAL ENCOUNTER (OUTPATIENT)
Facility: CLINIC | Age: 56
Discharge: HOME | End: 2021-06-03
Attending: SURGERY | Admitting: SURGERY
Payer: COMMERCIAL

## 2021-06-03 VITALS
HEART RATE: 60 BPM | BODY MASS INDEX: 26.37 KG/M2 | DIASTOLIC BLOOD PRESSURE: 68 MMHG | HEIGHT: 73 IN | RESPIRATION RATE: 12 BRPM | SYSTOLIC BLOOD PRESSURE: 111 MMHG | TEMPERATURE: 97.4 F | WEIGHT: 199 LBS

## 2021-06-03 DIAGNOSIS — R22.2 CHEST WALL MASS: ICD-10-CM

## 2021-06-03 PROCEDURE — 250N000009 HC RX 250: Performed by: SURGERY

## 2021-06-03 PROCEDURE — 12032 INTMD RPR S/A/T/EXT 2.6-7.5: CPT

## 2021-06-03 PROCEDURE — 88304 TISSUE EXAM BY PATHOLOGIST: CPT | Mod: 26 | Performed by: PATHOLOGY

## 2021-06-03 PROCEDURE — 11406 EXC TR-EXT B9+MARG >4.0 CM: CPT | Performed by: SURGERY

## 2021-06-03 PROCEDURE — 88304 TISSUE EXAM BY PATHOLOGIST: CPT | Mod: TC | Performed by: SURGERY

## 2021-06-03 RX ADMIN — LIDOCAINE HYDROCHLORIDE 10 ML: 10; .005 INJECTION, SOLUTION EPIDURAL; INFILTRATION; INTRACAUDAL; PERINEURAL at 10:40

## 2021-06-03 RX ADMIN — LIDOCAINE HYDROCHLORIDE 5 ML: 10; .005 INJECTION, SOLUTION EPIDURAL; INFILTRATION; INTRACAUDAL; PERINEURAL at 10:46

## 2021-06-03 ASSESSMENT — MIFFLIN-ST. JEOR: SCORE: 1786.54

## 2021-06-03 NOTE — BRIEF OP NOTE
[facility]    Brief Operative Note    Pre-operative diagnosis: Chest wall mass [R22.2]  Post-operative diagnosis Same as pre-operative diagnosis    Procedure: Procedure(s):  EXCISION SUBCUTANEOUS CYSTIC CHEST WALL MASS  Surgeon: Surgeon(s) and Role:     * Chanel Spence MD - Primary  Anesthesia: Local   Estimated blood loss: 5 ml   Drains: None  Specimens:   ID Type Source Tests Collected by Time Destination   A :  Cyst Chest SURGICAL PATHOLOGY EXAM Chanel Spence MD 6/3/2021 10:52 AM      Findings:   ~5 cm subcutaneous cystic lesion of anterior chest excised.  Complications: None.  Implants: None    Chanel Spence MD

## 2021-06-03 NOTE — OP NOTE
Procedure Date: 06/03/2021    SURGEON:  Chanel Spence MD    ASSISTANT:  None.    PREOPERATIVE DIAGNOSIS:  Subcutaneous cystic lesion of anterior chest wall.    POSTOPERATIVE DIAGNOSIS:  Subcutaneous cystic lesion of anterior chest wall.    PROCEDURE PERFORMED:  Excision of anterior chest wall subcutaneous cystic lesion.    INDICATIONS FOR OPERATION:  Jatinder is a 56-year-old male who presented for evaluation of subcutaneous cystic mass of the anterior chest.  The patient reports that the mass had been present for approximately 15 years.  The mass had been increasing in size.  Due to the size of the mass and the unpleasant expressible malodorous drainage, the decision was made for the patient to proceed to the procedure area for excision.  The risks and benefits of the procedure were discussed with the patient consented for the procedure was obtained.    ANESTHESIA:  Local.    DESCRIPTION OF PROCEDURE:  The patient was brought to the endoscopy procedure area.  He was placed on the patient cart.  The patient's anterior chest cystic lesion was prepped and draped in the sterile fashion.  The area surrounding and overlying the patient's cystic chest mass was infiltrated with local anesthetic.  The 15 blade scalpel was then used to make an elliptical skin incision overlying the patient's cystic mass.  The elliptical incision encompassed the central pore overlying the mass.  Dissection was carried out down through the subcutaneous tissues utilizing the 15 blade scalpel.  The patient's subcutaneous cystic lesion was then carefully dissected free from the surrounding tissues using blunt dissection and the iris scissors.  When the cyst was completely excised intact, it was found to measure approximately 5 cm in diameter.  The cystic lesion was passed off the table for pathologic evaluation.  The patient's surgical site was inspected and hemostasis was obtained using pressure.  The patient's wound was then reapproximated in  multiple layers utilizing interrupted 3-0 Vicryl for the deeper tissues and a running 4-0 Monocryl subcuticular stitch to reapproximate the skin.  The incision was washed and dried and skin glue was applied.  The lap, needle and instrument counts were correct at the end of the procedure.  The patient tolerated the procedure well and was discharged to home in stable condition.    ESTIMATED BLOOD LOSS:  5 mL    FINDINGS:  Approximately 5 cm subcutaneous cystic lesion of anterior chest, excised.    DRAINS:  None.    COMPLICATIONS:  None.    SPECIMEN: Chest cyst.    DRAINS:  None.    CONDITION:  The patient will be discharged to home directly from the procedure unit with instructions for postoperative cares.    Chanel Spence MD        D: 2021   T: 2021   MT: JACY    Name:     JENNIFFER SEGOVIA  MRN:      [id number]        Account:        [id number]   :      1965           Procedure Date: 2021     Document: [id number]

## 2021-06-04 LAB — COPATH REPORT: NORMAL

## 2021-09-12 ENCOUNTER — HEALTH MAINTENANCE LETTER (OUTPATIENT)
Age: 56
End: 2021-09-12

## 2021-11-16 ENCOUNTER — VIRTUAL VISIT (OUTPATIENT)
Dept: FAMILY MEDICINE | Facility: CLINIC | Age: 56
End: 2021-11-16
Payer: COMMERCIAL

## 2021-11-16 DIAGNOSIS — K22.4 DYSKINESIA OF ESOPHAGUS: ICD-10-CM

## 2021-11-16 PROCEDURE — 99213 OFFICE O/P EST LOW 20 MIN: CPT | Mod: GT | Performed by: FAMILY MEDICINE

## 2021-11-16 RX ORDER — DIAZEPAM 5 MG
5 TABLET ORAL EVERY 6 HOURS PRN
Qty: 12 TABLET | Refills: 0 | Status: SHIPPED | OUTPATIENT
Start: 2021-11-16 | End: 2022-03-28

## 2021-11-16 RX ORDER — OXYCODONE HYDROCHLORIDE 5 MG/1
5 TABLET ORAL EVERY 6 HOURS PRN
Qty: 12 TABLET | Refills: 0 | Status: SHIPPED | OUTPATIENT
Start: 2021-11-16 | End: 2022-03-28

## 2021-11-16 NOTE — PROGRESS NOTES
Jatinder is a 56 year old who is being evaluated via a billable video visit.      How would you like to obtain your AVS? Clifford  If the video visit is dropped, the invitation should be resent by: Clifford or else Text to cell phone: [phone]  Will anyone else be joining your video visit? No    Video Start Time: 9:04 AM    Assessment & Plan     Dyskinesia of esophagus    Limited use of medication for these intermittent esophageal dyskinesias  Not thought to be due to anything more serious  Last refill of 12 doses > 1 year ago      - oxyCODONE (ROXICODONE) 5 MG tablet; Take 1 tablet (5 mg) by mouth every 6 hours as needed for pain  - diazepam (VALIUM) 5 MG tablet; Take 1 tablet (5 mg) by mouth every 6 hours as needed for anxiety    No follow-ups on file.    Sergio Hastings MD  [facility]   Jatinder is a 56 year old who presents for the following health issues     History of Present Illness       He eats 2-3 servings of fruits and vegetables daily.He consumes 0 sweetened beverage(s) daily.He exercises with enough effort to increase his heart rate 20 to 29 minutes per day.  He exercises with enough effort to increase his heart rate 5 days per week.   He is taking medications regularly.       Medication Followup of oxycodone 5 mg, diazepam 5 mg    Taking Medication as prescribed: yes    Side Effects:  none    Medication Helping Symptoms:  yes        Pain History:  When did you first notice your pain? - More than 6 weeks   Have you seen this provider for your pain in the past?   Yes   Where in your body do you have pain? Epigastric region  Are you seeing anyone else for your pain? No    Starting over 10 years ago, he started to have these episodes where his digestion gets off track, and tends to build through the day, and starts to get some more pain over epigastric region.  The pain would increase and he would end up in ER most times.  Through the years, he has tried different  treatment includes acid blockers.  He has tried dietary restrictions with getting rid of caffeine, alcohol, etc.    Follow up for pain  Medication refills  About once a month has a problem  Uses some opiates and valium for this periodically  Went to [place] and had an endoscopy 4-5 years ago  Didn't come up with much    Did have upper endoscopy, which was overall ok.  Some mild inflammation.  He even went to [place], and nothing was really ever found to be an issue.  He has found that if he gets to it early, it does not progress.  He has relied on valium and oxycodone to treat symptoms.  At this point he is just managing symptoms.      The frequency tends to be quite varied.  He can go several months without, but then he can get a day or two in a row.      Tried nitroglycerin, other modalities for esophageal spasm      PDMP Review     None        Last CSA Agreement:   CSA -- Patient Level:    CSA: None found at the patient level.       Last UDS:           Review of Systems   Constitutional, HEENT, cardiovascular, pulmonary, gi and gu systems are negative, except as otherwise noted.      Objective           Vitals:  No vitals were obtained today due to virtual visit.    Physical Exam   GENERAL: Healthy, alert and no distress  EYES: Eyes grossly normal to inspection.  No discharge or erythema, or obvious scleral/conjunctival abnormalities.  RESP: No audible wheeze, cough, or visible cyanosis.  No visible retractions or increased work of breathing.    SKIN: Visible skin clear. No significant rash, abnormal pigmentation or lesions.  NEURO: Cranial nerves grossly intact.  Mentation and speech appropriate for age.  PSYCH: Mentation appears normal, affect normal/bright, judgement and insight intact, normal speech and appearance well-groomed.           Video-Visit Details    Type of service:  Video Visit    Video End Time:9:08 AM    Originating Location (pt. Location): Home    Distant Location (provider location):  [facility]  [facility]     Platform used for Video Visit: Vinay

## 2022-01-02 ENCOUNTER — HEALTH MAINTENANCE LETTER (OUTPATIENT)
Age: 57
End: 2022-01-02

## 2022-03-15 NOTE — PROGRESS NOTES
SUBJECTIVE:   CC: Jon Styles is an 57 year old male who presents for preventative health visit.       Patient has been advised of split billing requirements and indicates understanding: Yes  Healthy Habits:     Getting at least 3 servings of Calcium per day:  Yes    Bi-annual eye exam:  Yes    Dental care twice a year:  Yes    Sleep apnea or symptoms of sleep apnea:  None    Diet:  Regular (no restrictions)    Frequency of exercise:  4-5 days/week    Duration of exercise:  15-30 minutes    Taking medications regularly:  Not Applicable    Medication side effects:  Not applicable    PHQ-2 Total Score: 0    Additional concerns today:  No              Today's PHQ-2 Score:   PHQ-2 (  [employer]) 3/28/2022   Q1: Little interest or pleasure in doing things 0   Q2: Feeling down, depressed or hopeless 0   PHQ-2 Score 0   PHQ-2 Total Score (12-17 Years)- Positive if 3 or more points; Administer PHQ-A if positive -   Q1: Little interest or pleasure in doing things -   Q2: Feeling down, depressed or hopeless -   PHQ-2 Score -       Abuse: Current or Past(Physical, Sexual or Emotional)- No  Do you feel safe in your environment? Yes    Have you ever done Advance Care Planning? (For example, a Health Directive, POLST, or a discussion with a medical provider or your loved ones about your wishes): No, advance care planning information given to patient to review.  Patient declined advance care planning discussion at this time.    Social History     Tobacco Use     Smoking status: Former Smoker     Packs/day: 0.00     Years: 0.00     Pack years: 0.00     Quit date: 10/13/1995     Years since quittin.4     Smokeless tobacco: Never Used   Substance Use Topics     Alcohol use: Yes     Alcohol/week: 0.0 standard drinks     Comment: very modest as of      If you drink alcohol do you typically have >3 drinks per day or >7 drinks per week? No    No flowsheet data found.    Last PSA: No results found for: PSA    Reviewed orders  "with patient. Reviewed health maintenance and updated orders accordingly - Yes  BP Readings from Last 3 Encounters:   03/28/22 116/77   06/03/21 111/68   03/12/21 124/80    Wt Readings from Last 3 Encounters:   03/28/22 89.5 kg (197 lb 4.8 oz)   06/03/21 90.3 kg (199 lb)   03/12/21 88.5 kg (195 lb)                    Reviewed and updated as needed this visit by clinical staff                  Reviewed and updated as needed this visit by Provider                     Review of Systems   Constitutional: Negative for chills and fever.   HENT: Negative for congestion, ear pain, hearing loss and sore throat.    Eyes: Negative for pain and visual disturbance.   Respiratory: Negative for cough and shortness of breath.    Cardiovascular: Negative for chest pain, palpitations and peripheral edema.   Gastrointestinal: Negative for abdominal pain, constipation, diarrhea, heartburn, hematochezia and nausea.   Genitourinary: Negative for dysuria, frequency, genital sores, hematuria, impotence, penile discharge and urgency.   Musculoskeletal: Negative for arthralgias, joint swelling and myalgias.   Skin: Negative for rash.   Neurological: Negative for dizziness, weakness, headaches and paresthesias.   Psychiatric/Behavioral: Negative for mood changes. The patient is not nervous/anxious.          OBJECTIVE:   /77   Pulse 61   Temp 97.6  F (36.4  C)   Ht 1.854 m (6' 1\")   Wt 89.5 kg (197 lb 4.8 oz)   SpO2 100%   BMI 26.03 kg/m      Physical Exam  GENERAL: alert and no distress  EYES: Eyes grossly normal to inspection, PERRL and conjunctivae and sclerae normal  HENT: ear canals and TM's normal, nose and mouth without ulcers or lesions  NECK: no adenopathy, no asymmetry, masses, or scars and thyroid normal to palpation  RESP: lungs clear to auscultation - no rales, rhonchi or wheezes  CV: regular rate and rhythm, normal S1 S2, no S3 or S4, no murmur, click or rub, no peripheral edema and peripheral pulses strong  ABDOMEN: " "soft, nontender, no hepatosplenomegaly, no masses and bowel sounds normal  MS: no gross musculoskeletal defects noted, no edema  SKIN: no suspicious lesions or rashes  NEURO: Normal strength and tone, mentation intact and speech normal  PSYCH: mentation appears normal, affect normal/bright    Diagnostic Test Results:  Labs reviewed in Epic    ASSESSMENT/PLAN:   (Z00.00) Routine general medical examination at a health care facility  Comment: overall doing well.  Declines all immunization, routine lab work.  Plan:     (K22.4) Dyskinesia of esophagus  Comment: discussed risks of taking together, patient understands.  Has used prn for over 5 years.  Plan: diazepam (VALIUM) 5 MG tablet, oxyCODONE         (ROXICODONE) 5 MG tablet                  COUNSELING:   Reviewed preventive health counseling, as reflected in patient instructions       Regular exercise       Healthy diet/nutrition       Immunizations    Declined: TDAP and Zoster due to Conscientious objector               Prostate cancer screening    Estimated body mass index is 26.03 kg/m  as calculated from the following:    Height as of this encounter: 1.854 m (6' 1\").    Weight as of this encounter: 89.5 kg (197 lb 4.8 oz).     Weight management plan: Discussed healthy diet and exercise guidelines    He reports that he quit smoking about 26 years ago. He smoked 0.00 packs per day for 0.00 years. He has never used smokeless tobacco.      Counseling Resources:  ATP IV Guidelines  Pooled Cohorts Equation Calculator  FRAX Risk Assessment  ICSI Preventive Guidelines  Dietary Guidelines for Americans, 2010  USDA's MyPlate  ASA Prophylaxis  Lung CA Screening    [facility]  "

## 2022-03-27 ASSESSMENT — ENCOUNTER SYMPTOMS
HEMATURIA: 0
EYE PAIN: 0
CONSTIPATION: 0
DIARRHEA: 0
NAUSEA: 0
NERVOUS/ANXIOUS: 0
COUGH: 0
HEARTBURN: 0
HEMATOCHEZIA: 0
CHILLS: 0
SHORTNESS OF BREATH: 0
PARESTHESIAS: 0
DIZZINESS: 0
WEAKNESS: 0
DYSURIA: 0
ABDOMINAL PAIN: 0
SORE THROAT: 0
MYALGIAS: 0
FREQUENCY: 0
HEADACHES: 0
ARTHRALGIAS: 0
FEVER: 0
JOINT SWELLING: 0
PALPITATIONS: 0

## 2022-03-28 ENCOUNTER — OFFICE VISIT (OUTPATIENT)
Dept: FAMILY MEDICINE | Facility: CLINIC | Age: 57
End: 2022-03-28
Payer: COMMERCIAL

## 2022-03-28 VITALS
SYSTOLIC BLOOD PRESSURE: 116 MMHG | HEIGHT: 73 IN | DIASTOLIC BLOOD PRESSURE: 77 MMHG | OXYGEN SATURATION: 100 % | BODY MASS INDEX: 26.15 KG/M2 | HEART RATE: 61 BPM | TEMPERATURE: 97.6 F | WEIGHT: 197.3 LBS

## 2022-03-28 DIAGNOSIS — Z00.00 ROUTINE GENERAL MEDICAL EXAMINATION AT A HEALTH CARE FACILITY: ICD-10-CM

## 2022-03-28 DIAGNOSIS — K22.4 DYSKINESIA OF ESOPHAGUS: ICD-10-CM

## 2022-03-28 PROCEDURE — 99396 PREV VISIT EST AGE 40-64: CPT | Performed by: PHYSICIAN ASSISTANT

## 2022-03-28 RX ORDER — OXYCODONE HYDROCHLORIDE 5 MG/1
5 TABLET ORAL EVERY 6 HOURS PRN
Qty: 12 TABLET | Refills: 0 | Status: SHIPPED | OUTPATIENT
Start: 2022-03-28 | End: 2023-02-21

## 2022-03-28 RX ORDER — DIAZEPAM 5 MG
5 TABLET ORAL EVERY 6 HOURS PRN
Qty: 12 TABLET | Refills: 0 | Status: SHIPPED | OUTPATIENT
Start: 2022-03-28 | End: 2023-02-21

## 2022-03-28 ASSESSMENT — ENCOUNTER SYMPTOMS
CHILLS: 0
COUGH: 0
DYSURIA: 0
ARTHRALGIAS: 0
HEADACHES: 0
NERVOUS/ANXIOUS: 0
DIZZINESS: 0
JOINT SWELLING: 0
PARESTHESIAS: 0
MYALGIAS: 0
ABDOMINAL PAIN: 0
HEMATOCHEZIA: 0
SORE THROAT: 0
DIARRHEA: 0
SHORTNESS OF BREATH: 0
WEAKNESS: 0
NAUSEA: 0
HEMATURIA: 0
HEARTBURN: 0
PALPITATIONS: 0
FEVER: 0
FREQUENCY: 0
EYE PAIN: 0
CONSTIPATION: 0

## 2022-04-06 ENCOUNTER — IMMUNIZATION (OUTPATIENT)
Dept: NURSING | Facility: CLINIC | Age: 57
End: 2022-04-06
Payer: COMMERCIAL

## 2022-04-06 PROCEDURE — 91305 COVID-19,PF,PFIZER (12+ YRS): CPT

## 2022-04-06 PROCEDURE — 0054A COVID-19,PF,PFIZER (12+ YRS): CPT

## 2022-11-19 ENCOUNTER — HEALTH MAINTENANCE LETTER (OUTPATIENT)
Age: 57
End: 2022-11-19

## 2023-02-21 ENCOUNTER — OFFICE VISIT (OUTPATIENT)
Dept: FAMILY MEDICINE | Facility: CLINIC | Age: 58
End: 2023-02-21
Payer: COMMERCIAL

## 2023-02-21 VITALS
TEMPERATURE: 98.1 F | BODY MASS INDEX: 27.35 KG/M2 | HEIGHT: 72 IN | WEIGHT: 201.9 LBS | DIASTOLIC BLOOD PRESSURE: 78 MMHG | RESPIRATION RATE: 16 BRPM | HEART RATE: 57 BPM | SYSTOLIC BLOOD PRESSURE: 123 MMHG | OXYGEN SATURATION: 97 %

## 2023-02-21 DIAGNOSIS — K22.4 DYSKINESIA OF ESOPHAGUS: ICD-10-CM

## 2023-02-21 PROCEDURE — 99213 OFFICE O/P EST LOW 20 MIN: CPT | Performed by: PHYSICIAN ASSISTANT

## 2023-02-21 RX ORDER — OXYCODONE HYDROCHLORIDE 5 MG/1
5 TABLET ORAL EVERY 6 HOURS PRN
Qty: 12 TABLET | Refills: 0 | Status: SHIPPED | OUTPATIENT
Start: 2023-02-21 | End: 2024-01-04

## 2023-02-21 RX ORDER — DIAZEPAM 5 MG
5 TABLET ORAL EVERY 6 HOURS PRN
Qty: 12 TABLET | Refills: 0 | Status: SHIPPED | OUTPATIENT
Start: 2023-02-21 | End: 2024-01-04

## 2023-02-21 ASSESSMENT — ANXIETY QUESTIONNAIRES
GAD7 TOTAL SCORE: 0
6. BECOMING EASILY ANNOYED OR IRRITABLE: NOT AT ALL
GAD7 TOTAL SCORE: 0
3. WORRYING TOO MUCH ABOUT DIFFERENT THINGS: NOT AT ALL
7. FEELING AFRAID AS IF SOMETHING AWFUL MIGHT HAPPEN: NOT AT ALL
GAD7 TOTAL SCORE: 0
1. FEELING NERVOUS, ANXIOUS, OR ON EDGE: NOT AT ALL
2. NOT BEING ABLE TO STOP OR CONTROL WORRYING: NOT AT ALL
7. FEELING AFRAID AS IF SOMETHING AWFUL MIGHT HAPPEN: NOT AT ALL
5. BEING SO RESTLESS THAT IT IS HARD TO SIT STILL: NOT AT ALL
4. TROUBLE RELAXING: NOT AT ALL

## 2023-02-21 ASSESSMENT — PAIN SCALES - GENERAL: PAINLEVEL: NO PAIN (0)

## 2023-02-21 ASSESSMENT — PATIENT HEALTH QUESTIONNAIRE - PHQ9
SUM OF ALL RESPONSES TO PHQ QUESTIONS 1-9: 0
SUM OF ALL RESPONSES TO PHQ QUESTIONS 1-9: 0

## 2023-02-21 NOTE — PROGRESS NOTES
"  Assessment & Plan     Dyskinesia of esophagus  Discussed the risk of these medications, especially if taken together.  Did offer narcan.  He understands and did decline.  He has upcoming well exam  - diazepam (VALIUM) 5 MG tablet; Take 1 tablet (5 mg) by mouth every 6 hours as needed for anxiety  - oxyCODONE (ROXICODONE) 5 MG tablet; Take 1 tablet (5 mg) by mouth every 6 hours as needed for pain             BMI:   Estimated body mass index is 27.08 kg/m  as calculated from the following:    Height as of this encounter: 1.839 m (6' 0.4\").    Weight as of this encounter: 91.6 kg (201 lb 14.4 oz).           No follow-ups on file.    Familia Samano PA-C  [facility] is a 57 year old, presenting for the following health issues:  Recheck Medication      History of Present Illness       Reason for visit:  Prescription refill that requires office visit    He eats 2-3 servings of fruits and vegetables daily.He consumes 0 sweetened beverage(s) daily.He exercises with enough effort to increase his heart rate 20 to 29 minutes per day.  He exercises with enough effort to increase his heart rate 4 days per week.   He is taking medications regularly.    Today's PHQ-9         PHQ-9 Total Score: 0    PHQ-9 Q9 Thoughts of better off dead/self-harm past 2 weeks :   Not at all      Today's AMARI-7 Score: 0         Uses about 1 oxycodone and valium per month due to dyskinesia of esophagus.  It has not really gotten any worse of better through the years.  Tolerates meds well and has been very responsible with use.    Review of Systems   Constitutional, HEENT, cardiovascular, pulmonary, gi and gu systems are negative, except as otherwise noted.      Objective    /78   Pulse 57   Temp 98.1  F (36.7  C) (Temporal)   Resp 16   Ht 1.839 m (6' 0.4\")   Wt 91.6 kg (201 lb 14.4 oz)   SpO2 97%   BMI 27.08 kg/m    Body mass index is 27.08 kg/m .  Physical Exam   GENERAL: alert and no " distress  EYES: Eyes grossly normal to inspection  RESP: lungs clear to auscultation - no rales, rhonchi or wheezes  CV: regular rate and rhythm, normal S1 S2, no S3 or S4, no murmur, click or rub, no peripheral edema and peripheral pulses strong

## 2023-03-27 ASSESSMENT — ENCOUNTER SYMPTOMS
NAUSEA: 0
COUGH: 0
HEMATURIA: 0
HEMATOCHEZIA: 0
CHILLS: 0
ABDOMINAL PAIN: 0
NERVOUS/ANXIOUS: 0
CONSTIPATION: 0
SORE THROAT: 0
JOINT SWELLING: 0
DYSURIA: 0
FREQUENCY: 0
SHORTNESS OF BREATH: 0
ARTHRALGIAS: 0
DIZZINESS: 0
DIARRHEA: 0
FEVER: 0
MYALGIAS: 0
EYE PAIN: 0
WEAKNESS: 0
PARESTHESIAS: 0
HEADACHES: 0
HEARTBURN: 0
PALPITATIONS: 0

## 2023-04-03 ENCOUNTER — OFFICE VISIT (OUTPATIENT)
Dept: FAMILY MEDICINE | Facility: CLINIC | Age: 58
End: 2023-04-03
Payer: COMMERCIAL

## 2023-04-03 VITALS
WEIGHT: 199.1 LBS | OXYGEN SATURATION: 97 % | RESPIRATION RATE: 16 BRPM | TEMPERATURE: 97.4 F | DIASTOLIC BLOOD PRESSURE: 79 MMHG | HEART RATE: 56 BPM | HEIGHT: 73 IN | BODY MASS INDEX: 26.39 KG/M2 | SYSTOLIC BLOOD PRESSURE: 117 MMHG

## 2023-04-03 DIAGNOSIS — Z00.00 ROUTINE GENERAL MEDICAL EXAMINATION AT A HEALTH CARE FACILITY: Primary | ICD-10-CM

## 2023-04-03 DIAGNOSIS — E78.5 HYPERLIPIDEMIA LDL GOAL <160: ICD-10-CM

## 2023-04-03 DIAGNOSIS — L98.9 SKIN LESION: ICD-10-CM

## 2023-04-03 DIAGNOSIS — Z12.5 SCREENING PSA (PROSTATE SPECIFIC ANTIGEN): ICD-10-CM

## 2023-04-03 DIAGNOSIS — Z13.6 CARDIOVASCULAR SCREENING; LDL GOAL LESS THAN 160: ICD-10-CM

## 2023-04-03 DIAGNOSIS — Z23 NEED FOR VACCINATION: ICD-10-CM

## 2023-04-03 PROCEDURE — 90471 IMMUNIZATION ADMIN: CPT | Performed by: PHYSICIAN ASSISTANT

## 2023-04-03 PROCEDURE — 99396 PREV VISIT EST AGE 40-64: CPT | Mod: 25 | Performed by: PHYSICIAN ASSISTANT

## 2023-04-03 PROCEDURE — 90715 TDAP VACCINE 7 YRS/> IM: CPT | Performed by: PHYSICIAN ASSISTANT

## 2023-04-03 ASSESSMENT — ENCOUNTER SYMPTOMS
COUGH: 0
EYE PAIN: 0
HEARTBURN: 0
PALPITATIONS: 0
CONSTIPATION: 0
HEADACHES: 0
ABDOMINAL PAIN: 0
HEMATOCHEZIA: 0
NAUSEA: 0
ARTHRALGIAS: 0
FEVER: 0
PARESTHESIAS: 0
NERVOUS/ANXIOUS: 0
SORE THROAT: 0
WEAKNESS: 0
DYSURIA: 0
DIARRHEA: 0
CHILLS: 0
MYALGIAS: 0
SHORTNESS OF BREATH: 0
HEMATURIA: 0
JOINT SWELLING: 0
FREQUENCY: 0
DIZZINESS: 0

## 2023-04-03 ASSESSMENT — PAIN SCALES - GENERAL: PAINLEVEL: NO PAIN (0)

## 2023-04-03 NOTE — PROGRESS NOTES
SUBJECTIVE:   CC: Jatinder is an 58 year old who presents for preventative health visit.        View : No data to display.            Patient has been advised of split billing requirements and indicates understanding: Yes  Healthy Habits:     Getting at least 3 servings of Calcium per day:  Yes    Bi-annual eye exam:  Yes    Dental care twice a year:  Yes    Sleep apnea or symptoms of sleep apnea:  None    Diet:  Other    Frequency of exercise:  4-5 days/week    Duration of exercise:  15-30 minutes    Taking medications regularly:  Yes    Medication side effects:  None    PHQ-2 Total Score: 0    Additional concerns today:  No              Today's PHQ-2 Score:       3/27/2023     5:45 PM   PHQ-2 (  [employer])   Q1: Little interest or pleasure in doing things 0   Q2: Feeling down, depressed or hopeless 0   PHQ-2 Score 0   Q1: Little interest or pleasure in doing things Not at all   Q2: Feeling down, depressed or hopeless Not at all   PHQ-2 Score 0           Social History     Tobacco Use     Smoking status: Former     Packs/day: 0.00     Years: 0.00     Pack years: 0.00     Types: Cigarettes     Quit date: 10/13/1995     Years since quittin.4     Smokeless tobacco: Never   Vaping Use     Vaping status: Never Used   Substance Use Topics     Alcohol use: Yes     Comment: very modest as of 2012             3/27/2023     5:45 PM   Alcohol Use   Prescreen: >3 drinks/day or >7 drinks/week? No          View : No data to display.                Last PSA: No results found for: PSA    Reviewed orders with patient. Reviewed health maintenance and updated orders accordingly - Yes  BP Readings from Last 3 Encounters:   23 117/79   23 123/78   22 116/77    Wt Readings from Last 3 Encounters:   23 90.3 kg (199 lb 1.6 oz)   23 91.6 kg (201 lb 14.4 oz)   22 89.5 kg (197 lb 4.8 oz)                    Reviewed and updated as needed this visit by clinical staff   Tobacco  Allergies  Meds           "    Reviewed and updated as needed this visit by Provider                     Review of Systems   Constitutional: Negative for chills and fever.   HENT: Negative for congestion, ear pain, hearing loss and sore throat.    Eyes: Negative for pain and visual disturbance.   Respiratory: Negative for cough and shortness of breath.    Cardiovascular: Negative for chest pain, palpitations and peripheral edema.   Gastrointestinal: Negative for abdominal pain, constipation, diarrhea, heartburn, hematochezia and nausea.   Genitourinary: Negative for dysuria, frequency, genital sores, hematuria, impotence, penile discharge and urgency.   Musculoskeletal: Negative for arthralgias, joint swelling and myalgias.   Skin: Negative for rash.   Neurological: Negative for dizziness, weakness, headaches and paresthesias.   Psychiatric/Behavioral: Negative for mood changes. The patient is not nervous/anxious.          OBJECTIVE:   /79 (BP Location: Left arm, Patient Position: Sitting, Cuff Size: Adult Regular)   Pulse 56   Temp 97.4  F (36.3  C) (Temporal)   Resp 16   Ht 1.845 m (6' 0.64\")   Wt 90.3 kg (199 lb 1.6 oz)   SpO2 97%   BMI 26.53 kg/m      Physical Exam  GENERAL: alert and no distress  EYES: Eyes grossly normal to inspection  HENT: normal cephalic/atraumatic and ear canals and TM's normal  NECK: no adenopathy, no asymmetry, masses, or scars and thyroid normal to palpation  RESP: lungs clear to auscultation - no rales, rhonchi or wheezes  CV: regular rate and rhythm, normal S1 S2, no S3 or S4, no murmur, click or rub, no peripheral edema and peripheral pulses strong  ABDOMEN: soft, nontender, no hepatosplenomegaly, no masses and bowel sounds normal  MS: no gross musculoskeletal defects noted, no edema  SKIN: scaly/grey lesion left forearm  NEURO: Normal strength and tone, mentation intact and speech normal  PSYCH: mentation appears normal, affect normal/bright    Diagnostic Test Results:  Labs reviewed in " "Epic    ASSESSMENT/PLAN:   (Z00.00) Routine general medical examination at a health care facility  (primary encounter diagnosis)  Comment:   Plan:     (L98.9) Skin lesion  Comment: can try cortisone and keeping covered, if symptoms persist or worsen, follow up with derm  Plan: Adult Dermatology Referral            (E78.5) Hyperlipidemia LDL goal <160  Comment: declines fasting labs  Plan:     (Z12.5) Screening PSA (prostate specific antigen)  Comment: declines screening labs  Plan:     (Z23) Need for vaccination  TDAP VACCINE (Adacel, Boostrix)  [[id number]]            COUNSELING:   Reviewed preventive health counseling, as reflected in patient instructions       Regular exercise       Healthy diet/nutrition       Colorectal cancer screening       Prostate cancer screening      BMI:   Estimated body mass index is 26.53 kg/m  as calculated from the following:    Height as of this encounter: 1.845 m (6' 0.64\").    Weight as of this encounter: 90.3 kg (199 lb 1.6 oz).   Weight management plan: Discussed healthy diet and exercise guidelines      He reports that he quit smoking about 27 years ago. His smoking use included cigarettes. He has never used smokeless tobacco.            Familia Samano PA-C  [facility]  "

## 2023-04-03 NOTE — NURSING NOTE
Prior to immunization administration, verified patients identity using patient s name and date of birth. Please see Immunization Activity for additional information.     Screening Questionnaire for Adult Immunization    Are you sick today?   No   Do you have allergies to medications, food, a vaccine component or latex?   No   Have you ever had a serious reaction after receiving a vaccination?   No   Do you have a long-term health problem with heart, lung, kidney, or metabolic disease (e.g., diabetes), asthma, a blood disorder, no spleen, complement component deficiency, a cochlear implant, or a spinal fluid leak?  Are you on long-term aspirin therapy?   No   Do you have cancer, leukemia, HIV/AIDS, or any other immune system problem?   No   Do you have a parent, brother, or sister with an immune system problem?   No   In the past 3 months, have you taken medications that affect  your immune system, such as prednisone, other steroids, or anticancer drugs; drugs for the treatment of rheumatoid arthritis, Crohn s disease, or psoriasis; or have you had radiation treatments?   No   Have you had a seizure, or a brain or other nervous system problem?   No   During the past year, have you received a transfusion of blood or blood    products, or been given immune (gamma) globulin or antiviral drug?   No   For women: Are you pregnant or is there a chance you could become       pregnant during the next month?   No   Have you received any vaccinations in the past 4 weeks?   No     Immunization questionnaire answers were all negative.      Injection of tdap(adacel) given by Heaven Mohamud. Patient instructed to remain in clinic for 15 minutes afterwards, and to report any adverse reactions.     Screening performed by Heaven Mohamud on 4/3/2023 at 8:19 AM.

## 2023-08-28 DIAGNOSIS — M10.00 IDIOPATHIC GOUT: Primary | ICD-10-CM

## 2023-10-10 NOTE — TELEPHONE ENCOUNTER
Type of surgery: excision subcutaneous cystic chest wall mass  Location of surgery: [facility]  Date and time of surgery: 6/3/21 at 10:30am  Surgeon: Dr Spence  Pre-Op Appt Date: not required  Post-Op Appt Date: pt to schedule   Packet sent out: Yes  Pre-cert/Authorization completed:  Not Applicable  Date: 4/29/21     Catatonia No

## 2024-01-04 ENCOUNTER — VIRTUAL VISIT (OUTPATIENT)
Dept: FAMILY MEDICINE | Facility: CLINIC | Age: 59
End: 2024-01-04
Payer: COMMERCIAL

## 2024-01-04 DIAGNOSIS — K22.4 DYSKINESIA OF ESOPHAGUS: ICD-10-CM

## 2024-01-04 PROCEDURE — 99213 OFFICE O/P EST LOW 20 MIN: CPT | Mod: 95 | Performed by: PHYSICIAN ASSISTANT

## 2024-01-04 RX ORDER — DIAZEPAM 5 MG
5 TABLET ORAL EVERY 6 HOURS PRN
Qty: 12 TABLET | Refills: 0 | Status: SHIPPED | OUTPATIENT
Start: 2024-01-04

## 2024-01-04 RX ORDER — OXYCODONE HYDROCHLORIDE 5 MG/1
5 TABLET ORAL EVERY 6 HOURS PRN
Qty: 12 TABLET | Refills: 0 | Status: SHIPPED | OUTPATIENT
Start: 2024-01-04

## 2024-01-04 NOTE — PROGRESS NOTES
"Jatinder is a 58 year old who is being evaluated via a billable video visit.      How would you like to obtain your AVS? MyChart  If the video visit is dropped, the invitation should be resent by: Send to e-mail at: [email or web address]  Will anyone else be joining your video visit? No          Assessment & Plan     Dyskinesia of esophagus  Continue his very prn and responsible use of the meds for his idiopathic dyskinesia of esophagus.    - diazepam (VALIUM) 5 MG tablet; Take 1 tablet (5 mg) by mouth every 6 hours as needed for anxiety  - oxyCODONE (ROXICODONE) 5 MG tablet; Take 1 tablet (5 mg) by mouth every 6 hours as needed for pain             BMI:   Estimated body mass index is 26.53 kg/m  as calculated from the following:    Height as of 4/3/23: 1.845 m (6' 0.64\").    Weight as of 4/3/23: 90.3 kg (199 lb 1.6 oz).           Familia Samano PA-C  [facility]   Jatinder is a 58 year old, presenting for the following health issues:  Recheck Medication        4/3/2023     7:52 AM   Additional Questions   Roomed by VAUGHN Fields   Accompanied by Self       History of Present Illness       Reason for visit:  Prescription for medication refill    He eats 2-3 servings of fruits and vegetables daily.He consumes 0 sweetened beverage(s) daily.He exercises with enough effort to increase his heart rate 20 to 29 minutes per day.  He exercises with enough effort to increase his heart rate 5 days per week.   He is taking medications regularly.               Review of Systems   Constitutional, HEENT, cardiovascular, pulmonary, gi and gu systems are negative, except as otherwise noted.      Objective           Vitals:  No vitals were obtained today due to virtual visit.    Physical Exam   GENERAL: Healthy, alert and no distress  EYES: Eyes grossly normal to inspection.  No discharge or erythema, or obvious scleral/conjunctival abnormalities.  RESP: No audible wheeze, cough, or visible " cyanosis.  No visible retractions or increased work of breathing.    SKIN: Visible skin clear. No significant rash, abnormal pigmentation or lesions.  NEURO: Cranial nerves grossly intact.  Mentation and speech appropriate for age.  PSYCH: Mentation appears normal, affect normal/bright, judgement and insight intact, normal speech and appearance well-groomed.                Video-Visit Details    Type of service:  Video Visit     Originating Location (pt. Location): Home    Distant Location (provider location):  On-site  Platform used for Video Visit: [employer]

## 2024-06-15 ENCOUNTER — HEALTH MAINTENANCE LETTER (OUTPATIENT)
Age: 59
End: 2024-06-15

## 2024-12-03 ENCOUNTER — VIRTUAL VISIT (OUTPATIENT)
Dept: FAMILY MEDICINE | Facility: CLINIC | Age: 59
End: 2024-12-03
Payer: COMMERCIAL

## 2024-12-03 DIAGNOSIS — K22.4 DYSKINESIA OF ESOPHAGUS: ICD-10-CM

## 2024-12-03 PROCEDURE — 99213 OFFICE O/P EST LOW 20 MIN: CPT | Mod: 95 | Performed by: PHYSICIAN ASSISTANT

## 2024-12-03 RX ORDER — OXYCODONE HYDROCHLORIDE 5 MG/1
5 TABLET ORAL EVERY 6 HOURS PRN
Qty: 12 TABLET | Refills: 0 | Status: SHIPPED | OUTPATIENT
Start: 2024-12-03

## 2024-12-03 RX ORDER — DIAZEPAM 5 MG/1
5 TABLET ORAL EVERY 6 HOURS PRN
Qty: 12 TABLET | Refills: 0 | Status: SHIPPED | OUTPATIENT
Start: 2024-12-03

## 2024-12-03 NOTE — PROGRESS NOTES
Jatinder is a 59 year old who is being evaluated via a billable video visit.    How would you like to obtain your AVS? MyChart  If the video visit is dropped, the invitation should be resent by: Send to e-mail at: [email or web address]  Will anyone else be joining your video visit? No      Assessment & Plan     Dyskinesia of esophagus  Still uncertain etiology, but happens infrequent and meds work.  Fully understands the danger of meds, especially if taken in combination which I discouraged.  Offered script for narcan, did decline.  Has been responsible in past with use.  - diazepam (VALIUM) 5 MG tablet; Take 1 tablet (5 mg) by mouth every 6 hours as needed for anxiety.  - oxyCODONE (ROXICODONE) 5 MG tablet; Take 1 tablet (5 mg) by mouth every 6 hours as needed for pain.      Will be due for in clinic visit for future refills, encouraged well exam.          Subjective   Jatinder is a 59 year old, presenting for the following health issues:  No chief complaint on file.        12/3/2024    11:29 AM   Additional Questions   Roomed by hortencia in     History of Present Illness       Reason for visit:  Medication refill    He eats 2-3 servings of fruits and vegetables daily.He consumes 0 sweetened beverage(s) daily.He exercises with enough effort to increase his heart rate 10 to 19 minutes per day.  He exercises with enough effort to increase his heart rate 4 days per week.                Review of Systems  Constitutional, HEENT, cardiovascular, pulmonary, gi and gu systems are negative, except as otherwise noted.      Objective           Vitals:  No vitals were obtained today due to virtual visit.    Physical Exam   GENERAL: alert and no distress  EYES: Eyes grossly normal to inspection.  No discharge or erythema, or obvious scleral/conjunctival abnormalities.  RESP: No audible wheeze, cough, or visible cyanosis.    SKIN: Visible skin clear. No significant rash, abnormal pigmentation or lesions.  NEURO: Cranial nerves grossly  intact.  Mentation and speech appropriate for age.  PSYCH: Appropriate affect, tone, and pace of words          Video-Visit Details    Type of service:  Video Visit   Originating Location (pt. Location): Home    Distant Location (provider location):  Off-site  Platform used for Video Visit: [facility]  Signed Electronically by: Familia Samano PA-C

## 2025-07-06 ENCOUNTER — HEALTH MAINTENANCE LETTER (OUTPATIENT)
Age: 60
End: 2025-07-06